# Patient Record
Sex: FEMALE | Race: WHITE
[De-identification: names, ages, dates, MRNs, and addresses within clinical notes are randomized per-mention and may not be internally consistent; named-entity substitution may affect disease eponyms.]

---

## 2018-04-16 ENCOUNTER — HOSPITAL ENCOUNTER (OUTPATIENT)
Dept: HOSPITAL 80 - FSGY | Age: 63
Discharge: NURSING, REHAB OR LONG-TERM CARE | End: 2018-04-16
Attending: SURGERY
Payer: OTHER GOVERNMENT

## 2018-04-16 VITALS — DIASTOLIC BLOOD PRESSURE: 60 MMHG | SYSTOLIC BLOOD PRESSURE: 122 MMHG

## 2018-04-16 DIAGNOSIS — Z79.4: ICD-10-CM

## 2018-04-16 DIAGNOSIS — E11.22: Primary | ICD-10-CM

## 2018-04-16 DIAGNOSIS — G35: ICD-10-CM

## 2018-04-16 DIAGNOSIS — N18.4: ICD-10-CM

## 2018-04-16 LAB — PLATELET # BLD: 345 10^3/UL (ref 150–400)

## 2018-04-16 PROCEDURE — 03180ZF BYPASS LEFT BRACHIAL ARTERY TO LOWER ARM VEIN, OPEN APPROACH: ICD-10-PCS | Performed by: SURGERY

## 2018-04-16 NOTE — POSTOPPROG
Post Op Note


Date of Operation: 04/16/18


Surgeon: Yvon Elizalde


Assistant: Una Aponte


Anesthesiologist: Paula Valerio


Anesthesia: GET(General Endotracheal)


Pre-op Diagnosis: CRF


Post-op Diagnosis: same


Procedure: LUE brachiocephalic AVF. 


Findings: +thrill.


Inf/Abcess present in the surg proc area at time of surgery?: No


EBL: Minimal


Complications: 


none

## 2018-04-16 NOTE — PDANEPAE
ANE History of Present Illness





61 yo female with MS (wheelchair bound), DM II now with stage IV CKD for HD 

access.





ANE Past Medical History





- Cardiovascular History


Hx Hypertension: No


Hx Arrhythmias: No





- Pulmonary History


Hx COPD: No


Hx Asthma/Reactive Airway Disease: No


Hx Oxygen in Use at Home: No


Hx Sleep Apnea: No


Sleep Apnea Screening Result - Last Documented: Negative





- Endocrine History


Hx Diabetes: Yes


Hypothyroid: No


Hyperthyroid: No


Endocrine History Comment: IDDM





- Renal History


Hx Renal Disorders: Yes


Renal History Comment: RENAL FAILURE.  PYELONEPHTITIS 8/2017.  HX OF STONE/

INFECTION 7/2017.  NEUROMUSCULAR DYSFUNCTION OF BLADDER





- Neurological & Psychiatric Hx


Hx Neurological and Psychiatric Disorders: Yes


Neurological / Psychiatric History Comment: memory lapses attributed to MS by pt





- GI History


GERD: moderate


Hx Gastrointestinal Disorders: Yes


Gastrointestinal History Comment: REFLUX.  CONSTIPATION





- Other Health History


Other Health History: RT NEPHROSTOMY TUBE SITE.  UNDER OBSERVATION FOR 

DRAINAGE.  MULTIPLE SCLEROSIS DX 1994.  ANEMIA





- Surgical History


Prior Surgeries: REMVL KIDNEY STONE/STENT





ANE Review of Systems


Review of Systems: 








- Exercise capacity


METS (RN): 1 METS





- Systems


EENMT: Reports: no symptoms


Cardiac: Reports: no symptoms


Respiratory: Reports: cough (dry cough this AM, no other symptoms)





ANE Patient History





- Allergies


Allergies/Adverse Reactions: 








No Known Allergies Allergy (Unverified 04/03/18 14:13)


 








- Home Medications


Home Medications: 








ACETAMINOPHEN PRN 04/03/18 [Last Taken 1 Day Ago ~04/15/18]


Aspirin DAILY 04/03/18 [Last Taken 1 Day Ago ~04/15/18]


Herbals/Supplements -Info Only DAILY 04/03/18 [Last Taken 1 Day Ago ~04/15/18]


Lantus Solostar HS 04/03/18 [Last Taken 1 Day Ago ~04/15/18]


Miconazole DAILY 04/03/18 [Last Taken 1 Day Ago ~04/15/18]


Omeprazole DAILY 04/03/18 [Last Taken 1 Day Ago ~04/15/18]


Polyethylene Glycol 3350 PRN 04/03/18 [Last Taken 1 Day Ago ~04/15/18]


Sennosides/Docusate Sodium BID 04/03/18 [Last Taken 1 Day Ago ~04/15/18]


Sodium Bicarbonate BID 04/03/18 [Last Taken 1 Day Ago ~04/15/18]


Triamcinolone 0.1% BID 04/03/18 [Last Taken 04/16/18]


Zofran Odt 4 mg (*) PRN 04/03/18 [Last Taken 2 Days Ago ~04/14/18]








- NPO status


NPO Since - Liquids (Date): 04/15/18


NPO Since - Liquids (Time): 20:00


NPO Since - Solids (Date): 04/15/18


NPO Since - Solids (Time): 20:00





- Anes Hx


Anes Hx: no prior problems





- Smoking Hx


Smoking Status: Unknown if ever smoked


Marijuana use: No





- Alcohol Use


Alcohol Use: None





- Family Anes Hx


Family Anes Hx: neg - N/A





ANE Labs/Vital Signs





- Labs


Result Diagrams: 


 04/16/18 09:15





 04/16/18 09:15





- Vital Signs


Blood Pressure: 145/86


Heart Rate: 93


Respiratory Rate: 16


O2 Sat (%): 91


Height: 165.1 cm


Weight: 83.234 kg





ANE Physical Exam





- Airway


Mouth exam: poor dentition





- Pulmonary


Pulmonary: clear to auscultation





- Cardiovascular


Cardiovascular: regular rate and rhythym





- ASA Status


ASA Status: IV





ANE Anesthesia Plan


Anesthesia Plan: general endotracheal anesthesia

## 2018-04-16 NOTE — POSTANESTH
Post Anesthetic Evaluation


Cardiovascular Status: Normal, Stable


Respiratory Status: Normal, Stable


Level of Consciousness/Mental Status: Moderately Sleepy


Pain Control: Adequate, Prn Tx Ordered


Nausea/Vomiting Control: Adequate, Prn Tx Ordered


Complications Possibly Related to Anesthesia: None Noted